# Patient Record
(demographics unavailable — no encounter records)

---

## 2024-12-03 NOTE — REASON FOR VISIT
[Follow-Up] : a follow-up visit [Abnormal CXR/ Chest CT] : an abnormal CXR/ chest CT [TextBox_44] : THAKUR

## 2024-12-03 NOTE — HISTORY OF PRESENT ILLNESS
[Former] : former [>= 20 pack years] : >= 20 pack years [TextBox_4] : 52-year-old male with history of urinary bladder CA being treated [facility] with abnormal chest CT which as per the patient was "unchanged" in October 2024, presents complaining of few week history of THAKUR with palpitations.  He denies cough, chest pain, hemoptysis, night sweats or weight loss.  He was evaluated by cardiologist last month told all was "okay" [YearQuit] : 2022 [TextBox_29] : .s0min